# Patient Record
Sex: MALE | Race: BLACK OR AFRICAN AMERICAN | NOT HISPANIC OR LATINO | URBAN - METROPOLITAN AREA
[De-identification: names, ages, dates, MRNs, and addresses within clinical notes are randomized per-mention and may not be internally consistent; named-entity substitution may affect disease eponyms.]

---

## 2021-07-11 ENCOUNTER — EMERGENCY (EMERGENCY)
Facility: HOSPITAL | Age: 31
LOS: 1 days | Discharge: HOME | End: 2021-07-11
Attending: EMERGENCY MEDICINE
Payer: COMMERCIAL

## 2021-07-11 VITALS
SYSTOLIC BLOOD PRESSURE: 133 MMHG | TEMPERATURE: 99 F | HEART RATE: 68 BPM | RESPIRATION RATE: 16 BRPM | DIASTOLIC BLOOD PRESSURE: 85 MMHG | HEIGHT: 75 IN | WEIGHT: 289.91 LBS | OXYGEN SATURATION: 97 %

## 2021-07-11 VITALS
SYSTOLIC BLOOD PRESSURE: 132 MMHG | TEMPERATURE: 99 F | DIASTOLIC BLOOD PRESSURE: 88 MMHG | HEART RATE: 70 BPM | RESPIRATION RATE: 18 BRPM | OXYGEN SATURATION: 98 %

## 2021-07-11 PROCEDURE — 96372 THER/PROPH/DIAG INJ SC/IM: CPT

## 2021-07-11 PROCEDURE — 99284 EMERGENCY DEPT VISIT MOD MDM: CPT

## 2021-07-11 PROCEDURE — 99283 EMERGENCY DEPT VISIT LOW MDM: CPT | Mod: 25

## 2021-07-11 RX ORDER — KETOROLAC TROMETHAMINE 30 MG/ML
30 SYRINGE (ML) INJECTION ONCE
Refills: 0 | Status: DISCONTINUED | OUTPATIENT
Start: 2021-07-11 | End: 2021-07-11

## 2021-07-11 RX ORDER — ACETAMINOPHEN 500 MG
975 TABLET ORAL ONCE
Refills: 0 | Status: COMPLETED | OUTPATIENT
Start: 2021-07-11 | End: 2021-07-11

## 2021-07-11 RX ORDER — DEXAMETHASONE 0.5 MG/5ML
10 ELIXIR ORAL ONCE
Refills: 0 | Status: COMPLETED | OUTPATIENT
Start: 2021-07-11 | End: 2021-07-11

## 2021-07-11 RX ORDER — IBUPROFEN 200 MG
600 TABLET ORAL ONCE
Refills: 0 | Status: COMPLETED | OUTPATIENT
Start: 2021-07-11 | End: 2021-07-11

## 2021-07-11 RX ADMIN — Medication 30 MILLIGRAM(S): at 12:36

## 2021-07-11 RX ADMIN — Medication 30 MILLIGRAM(S): at 14:21

## 2021-07-11 RX ADMIN — Medication 10 MILLIGRAM(S): at 12:37

## 2021-07-11 NOTE — ED PROVIDER NOTE - CLINICAL SUMMARY MEDICAL DECISION MAKING FREE TEXT BOX
Lucero Benjamin MD - Attending Physician: Pt here with sore throat, pain with swallowing. No drooling, FROM neck, no trismus, no muffled voice. Uvula midline, no signs of PTA. No red flags. Symptomatic treatment, PO chall

## 2021-07-11 NOTE — ED PROVIDER NOTE - THROAT FINDINGS
no lymphadenopathy/no exudate/THROAT RED/uvula midline/no vesicles/NO VESICLES/ULCERS/NO DROOLING/NO TONGUE ELEVATION/NO STRIDOR no lymphadenopathy/no exudate/THROAT RED/uvula midline/no vesicles/NO VESICLES/ULCERS/TONSILLAR SWELLING/NO DROOLING/NO TONGUE ELEVATION/NO STRIDOR

## 2021-07-11 NOTE — ED ADULT NURSE NOTE - OBJECTIVE STATEMENT
pt here for a sore throat for 5 days   he went to [facility] and was told he had a red throat and was tested negative for covid.  pt cannot swallow own saliva and has had no po fluid today  he has voided "normally"  no fevers noted

## 2021-07-11 NOTE — ED PROVIDER NOTE - OBJECTIVE STATEMENT
29yo male pt, 29yo male pt, no PMHx, current smoker presents to ED 31yo male pt, no PMHx, current smoker presents to ED c/o left sore throat and painful swallowing for 3days. Pt's also had watery diarrhea since yesterday (5time a day). Pt's seen by UC yesterday and checked both strep and COVID negative. He's not taken any pain medications due to painful swallowing. Denies drooling. Denies fever, chills, or congestion. Denies CP/SOB/ABD pain or N/V. Denies urinary problems. Denies sensory changes or weakness to extremities.

## 2021-07-11 NOTE — ED PROVIDER NOTE - PATIENT PORTAL LINK FT
You can access the FollowMyHealth Patient Portal offered by [facility] by registering at the following website: [email or web address]. By joining [employer]’s FollowMyHealth portal, you will also be able to view your health information using other applications (apps) compatible with our system.

## 2021-07-12 ENCOUNTER — EMERGENCY (EMERGENCY)
Facility: HOSPITAL | Age: 31
LOS: 1 days | Discharge: HOME | End: 2021-07-12
Attending: EMERGENCY MEDICINE
Payer: COMMERCIAL

## 2021-07-12 VITALS
SYSTOLIC BLOOD PRESSURE: 112 MMHG | HEIGHT: 75 IN | WEIGHT: 289.91 LBS | DIASTOLIC BLOOD PRESSURE: 69 MMHG | TEMPERATURE: 99 F | HEART RATE: 68 BPM | RESPIRATION RATE: 18 BRPM | OXYGEN SATURATION: 98 %

## 2021-07-12 DIAGNOSIS — J36 PERITONSILLAR ABSCESS: ICD-10-CM

## 2021-07-12 LAB
ALBUMIN SERPL ELPH-MCNC: 4.4 G/DL — SIGNIFICANT CHANGE UP (ref 3.3–5)
ALP SERPL-CCNC: 86 U/L — SIGNIFICANT CHANGE UP (ref 40–120)
ALT FLD-CCNC: 20 U/L — SIGNIFICANT CHANGE UP (ref 10–45)
ANION GAP SERPL CALC-SCNC: 14 MMOL/L — SIGNIFICANT CHANGE UP (ref 5–17)
AST SERPL-CCNC: 16 U/L — SIGNIFICANT CHANGE UP (ref 10–40)
BASE EXCESS BLDV CALC-SCNC: 4.2 MMOL/L — HIGH (ref -2–2)
BASOPHILS # BLD AUTO: 0 K/UL — SIGNIFICANT CHANGE UP (ref 0–0.2)
BASOPHILS NFR BLD AUTO: 0 % — SIGNIFICANT CHANGE UP (ref 0–2)
BILIRUB SERPL-MCNC: 0.9 MG/DL — SIGNIFICANT CHANGE UP (ref 0.2–1.2)
BUN SERPL-MCNC: 18 MG/DL — SIGNIFICANT CHANGE UP (ref 7–23)
CA-I SERPL-SCNC: 1.16 MMOL/L — SIGNIFICANT CHANGE UP (ref 1.12–1.3)
CALCIUM SERPL-MCNC: 10.1 MG/DL — SIGNIFICANT CHANGE UP (ref 8.4–10.5)
CHLORIDE BLDV-SCNC: 107 MMOL/L — SIGNIFICANT CHANGE UP (ref 96–108)
CHLORIDE SERPL-SCNC: 102 MMOL/L — SIGNIFICANT CHANGE UP (ref 96–108)
CO2 BLDV-SCNC: 29 MMOL/L — SIGNIFICANT CHANGE UP (ref 22–30)
CO2 SERPL-SCNC: 22 MMOL/L — SIGNIFICANT CHANGE UP (ref 22–31)
CREAT SERPL-MCNC: 1.4 MG/DL — HIGH (ref 0.5–1.3)
EOSINOPHIL # BLD AUTO: 0 K/UL — SIGNIFICANT CHANGE UP (ref 0–0.5)
EOSINOPHIL NFR BLD AUTO: 0 % — SIGNIFICANT CHANGE UP (ref 0–6)
GAS PNL BLDV: 138 MMOL/L — SIGNIFICANT CHANGE UP (ref 135–145)
GAS PNL BLDV: SIGNIFICANT CHANGE UP
GIANT PLATELETS BLD QL SMEAR: PRESENT — SIGNIFICANT CHANGE UP
GLUCOSE BLDV-MCNC: 118 MG/DL — HIGH (ref 70–99)
GLUCOSE SERPL-MCNC: 116 MG/DL — HIGH (ref 70–99)
HCO3 BLDV-SCNC: 28 MMOL/L — SIGNIFICANT CHANGE UP (ref 21–29)
HCT VFR BLD CALC: 49.2 % — SIGNIFICANT CHANGE UP (ref 39–50)
HCT VFR BLDA CALC: 56 % — HIGH (ref 39–50)
HGB BLD CALC-MCNC: 18.5 G/DL — HIGH (ref 13–17)
HGB BLD-MCNC: 17.4 G/DL — HIGH (ref 13–17)
LACTATE BLDV-MCNC: 1.6 MMOL/L — SIGNIFICANT CHANGE UP (ref 0.7–2)
LYMPHOCYTES # BLD AUTO: 12.2 % — LOW (ref 13–44)
LYMPHOCYTES # BLD AUTO: 2.13 K/UL — SIGNIFICANT CHANGE UP (ref 1–3.3)
MANUAL SMEAR VERIFICATION: SIGNIFICANT CHANGE UP
MCHC RBC-ENTMCNC: 30.3 PG — SIGNIFICANT CHANGE UP (ref 27–34)
MCHC RBC-ENTMCNC: 35.4 GM/DL — SIGNIFICANT CHANGE UP (ref 32–36)
MCV RBC AUTO: 85.6 FL — SIGNIFICANT CHANGE UP (ref 80–100)
MONOCYTES # BLD AUTO: 1.68 K/UL — HIGH (ref 0–0.9)
MONOCYTES NFR BLD AUTO: 9.6 % — SIGNIFICANT CHANGE UP (ref 2–14)
NEUTROPHILS # BLD AUTO: 13.67 K/UL — HIGH (ref 1.8–7.4)
NEUTROPHILS NFR BLD AUTO: 78.2 % — HIGH (ref 43–77)
OTHER CELLS CSF MANUAL: 24 ML/DL — HIGH (ref 18–22)
PCO2 BLDV: 41 MMHG — SIGNIFICANT CHANGE UP (ref 35–50)
PH BLDV: 7.45 — SIGNIFICANT CHANGE UP (ref 7.35–7.45)
PLAT MORPH BLD: NORMAL — SIGNIFICANT CHANGE UP
PLATELET # BLD AUTO: 227 K/UL — SIGNIFICANT CHANGE UP (ref 150–400)
PO2 BLDV: 65 MMHG — HIGH (ref 25–45)
POTASSIUM BLDV-SCNC: 5.2 MMOL/L — SIGNIFICANT CHANGE UP (ref 3.5–5.3)
POTASSIUM SERPL-MCNC: 4.3 MMOL/L — SIGNIFICANT CHANGE UP (ref 3.5–5.3)
POTASSIUM SERPL-SCNC: 4.3 MMOL/L — SIGNIFICANT CHANGE UP (ref 3.5–5.3)
PROT SERPL-MCNC: 8.9 G/DL — HIGH (ref 6–8.3)
RBC # BLD: 5.75 M/UL — SIGNIFICANT CHANGE UP (ref 4.2–5.8)
RBC # FLD: 13.5 % — SIGNIFICANT CHANGE UP (ref 10.3–14.5)
RBC BLD AUTO: NORMAL — SIGNIFICANT CHANGE UP
SAO2 % BLDV: 92 % — HIGH (ref 67–88)
SARS-COV-2 RNA SPEC QL NAA+PROBE: SIGNIFICANT CHANGE UP
SODIUM SERPL-SCNC: 138 MMOL/L — SIGNIFICANT CHANGE UP (ref 135–145)
WBC # BLD: 17.48 K/UL — HIGH (ref 3.8–10.5)
WBC # FLD AUTO: 17.48 K/UL — HIGH (ref 3.8–10.5)

## 2021-07-12 PROCEDURE — 99284 EMERGENCY DEPT VISIT MOD MDM: CPT | Mod: 25

## 2021-07-12 PROCEDURE — ZZZZZ: CPT

## 2021-07-12 PROCEDURE — 70491 CT SOFT TISSUE NECK W/DYE: CPT | Mod: 26,MG

## 2021-07-12 PROCEDURE — 42700 I&D ABSCESS PERITONSILLAR: CPT

## 2021-07-12 PROCEDURE — G1004: CPT

## 2021-07-12 PROCEDURE — 99220: CPT

## 2021-07-12 RX ORDER — AMPICILLIN SODIUM AND SULBACTAM SODIUM 250; 125 MG/ML; MG/ML
3 INJECTION, POWDER, FOR SUSPENSION INTRAMUSCULAR; INTRAVENOUS EVERY 6 HOURS
Refills: 0 | Status: DISCONTINUED | OUTPATIENT
Start: 2021-07-12 | End: 2021-07-16

## 2021-07-12 RX ORDER — DEXAMETHASONE 0.5 MG/5ML
4 ELIXIR ORAL ONCE
Refills: 0 | Status: COMPLETED | OUTPATIENT
Start: 2021-07-12 | End: 2021-07-12

## 2021-07-12 RX ORDER — SODIUM CHLORIDE 9 MG/ML
1000 INJECTION INTRAMUSCULAR; INTRAVENOUS; SUBCUTANEOUS ONCE
Refills: 0 | Status: COMPLETED | OUTPATIENT
Start: 2021-07-12 | End: 2021-07-12

## 2021-07-12 RX ORDER — DEXAMETHASONE 0.5 MG/5ML
10 ELIXIR ORAL EVERY 8 HOURS
Refills: 0 | Status: COMPLETED | OUTPATIENT
Start: 2021-07-12 | End: 2021-07-13

## 2021-07-12 RX ORDER — MORPHINE SULFATE 50 MG/1
4 CAPSULE, EXTENDED RELEASE ORAL ONCE
Refills: 0 | Status: DISCONTINUED | OUTPATIENT
Start: 2021-07-12 | End: 2021-07-12

## 2021-07-12 RX ORDER — KETOROLAC TROMETHAMINE 30 MG/ML
15 SYRINGE (ML) INJECTION ONCE
Refills: 0 | Status: DISCONTINUED | OUTPATIENT
Start: 2021-07-12 | End: 2021-07-12

## 2021-07-12 RX ORDER — SODIUM CHLORIDE 9 MG/ML
1000 INJECTION INTRAMUSCULAR; INTRAVENOUS; SUBCUTANEOUS
Refills: 0 | Status: DISCONTINUED | OUTPATIENT
Start: 2021-07-12 | End: 2021-07-16

## 2021-07-12 RX ORDER — ACETAMINOPHEN 500 MG
975 TABLET ORAL EVERY 6 HOURS
Refills: 0 | Status: DISCONTINUED | OUTPATIENT
Start: 2021-07-12 | End: 2021-07-16

## 2021-07-12 RX ORDER — DEXAMETHASONE 0.5 MG/5ML
6 ELIXIR ORAL ONCE
Refills: 0 | Status: COMPLETED | OUTPATIENT
Start: 2021-07-12 | End: 2021-07-12

## 2021-07-12 RX ORDER — AMPICILLIN SODIUM AND SULBACTAM SODIUM 250; 125 MG/ML; MG/ML
3 INJECTION, POWDER, FOR SUSPENSION INTRAMUSCULAR; INTRAVENOUS ONCE
Refills: 0 | Status: COMPLETED | OUTPATIENT
Start: 2021-07-12 | End: 2021-07-12

## 2021-07-12 RX ORDER — CHLORHEXIDINE GLUCONATE 213 G/1000ML
15 SOLUTION TOPICAL
Refills: 0 | Status: DISCONTINUED | OUTPATIENT
Start: 2021-07-12 | End: 2021-07-16

## 2021-07-12 RX ADMIN — Medication 15 MILLIGRAM(S): at 23:22

## 2021-07-12 RX ADMIN — Medication 15 MILLIGRAM(S): at 22:55

## 2021-07-12 RX ADMIN — SODIUM CHLORIDE 150 MILLILITER(S): 9 INJECTION INTRAMUSCULAR; INTRAVENOUS; SUBCUTANEOUS at 19:33

## 2021-07-12 RX ADMIN — SODIUM CHLORIDE 1000 MILLILITER(S): 9 INJECTION INTRAMUSCULAR; INTRAVENOUS; SUBCUTANEOUS at 14:24

## 2021-07-12 RX ADMIN — AMPICILLIN SODIUM AND SULBACTAM SODIUM 200 GRAM(S): 250; 125 INJECTION, POWDER, FOR SUSPENSION INTRAMUSCULAR; INTRAVENOUS at 21:42

## 2021-07-12 RX ADMIN — Medication 6 MILLIGRAM(S): at 14:24

## 2021-07-12 RX ADMIN — Medication 102 MILLIGRAM(S): at 22:55

## 2021-07-12 RX ADMIN — Medication 15 MILLIGRAM(S): at 14:24

## 2021-07-12 RX ADMIN — MORPHINE SULFATE 4 MILLIGRAM(S): 50 CAPSULE, EXTENDED RELEASE ORAL at 14:52

## 2021-07-12 RX ADMIN — MORPHINE SULFATE 4 MILLIGRAM(S): 50 CAPSULE, EXTENDED RELEASE ORAL at 18:07

## 2021-07-12 RX ADMIN — AMPICILLIN SODIUM AND SULBACTAM SODIUM 200 GRAM(S): 250; 125 INJECTION, POWDER, FOR SUSPENSION INTRAMUSCULAR; INTRAVENOUS at 14:52

## 2021-07-12 RX ADMIN — CHLORHEXIDINE GLUCONATE 15 MILLILITER(S): 213 SOLUTION TOPICAL at 19:35

## 2021-07-12 RX ADMIN — Medication 4 MILLIGRAM(S): at 14:52

## 2021-07-12 NOTE — ED ADULT NURSE NOTE - OBJECTIVE STATEMENT
29 yo male marijuana smoker presents to ED c/o of difficulty swallowing since Thursday. Pt seen and dc here yesterday but states swelling and pain is worsening prompting ED visit. +muffled voice, +redness/swelling to posterior pharynx L > R. Pt not tolerating secretions and spitting it into emesis bag. Lungs CTAB in NAD, able to speak few word sentences due to pain when talking. Denies CP, SOB, n/v/d, fevers, abdominal pain, urinary symptoms, weakness, fatigue, numbness, tingling in upper and lower extremities, HA, blurry vision. VSS updated on plan of care.

## 2021-07-12 NOTE — ED CDU PROVIDER INITIAL DAY NOTE - PROGRESS NOTE DETAILS
CDU PROGRESS NOTE TRACY PEREZ: Received pt at 1900 sign-out. Case/plan reviewed. VSS. ENT: Head NCAT. MMM, posterior pharynx erythematous, b/l tonsillar enlargement L>>R. R sided tonsillar deviation. +mild muffled voice, mild trismus, No exudates. NO excessive salivation, NO evidence  of airway obstruction, No stridor, trachea midline. Pt reports having 5/10 pain, will give Toradol 15mg IVP and closely monitor.

## 2021-07-12 NOTE — ED CDU PROVIDER DISPOSITION NOTE - CLINICAL COURSE
31 yo male, current everyday smoker presents to the ED c/o left side throat pain, difficulty swallowing  4 days. Pt went to urgent care 2 days ago and had negative covid/strep test done. Came to ED yesterday for sxs and was given toradol, and decadron w/ relief so discharged home. Last night pain worsened again and has been progressively worsening w/ severe pain w/ swallowing and difficulty tolerating secretions. Has not been able to tolerate any PO meds today. +Hoarse voice. No fever/chills, weakness, recent travel, cp, sob, neck swelling, new medication usage, perioral/tongue numbness. Vaccinations UTD.  ED Course: Pt with L PTA on exam, drained with small volume return by ENT. Labs significant for WBC 17, Cr 1.4, lactate wnl. CT neck showed "Fairly large bilobed left-sided peritonsillar/hypopharyngeal/supraglottic phlegmon/early abscess. Mild associated retropharyngeal edema." Case d/w ENT who recommended unasyn, decadron, and peridex gargles. Pt sent to CDU for IV abx, pain control, advance diet, IV fluids, frequent eval.  CDU Course: _____ 29 yo male, current everyday smoker presents to the ED c/o left side throat pain, difficulty swallowing  4 days. Pt went to urgent care 2 days ago and had negative covid/strep test done. Came to ED yesterday for sxs and was given toradol, and decadron w/ relief so discharged home. Last night pain worsened again and has been progressively worsening w/ severe pain w/ swallowing and difficulty tolerating secretions. Has not been able to tolerate any PO meds today. +Hoarse voice. No fever/chills, weakness, recent travel, cp, sob, neck swelling, new medication usage, perioral/tongue numbness. Vaccinations UTD.  ED Course: Pt with L PTA on exam, drained with small volume return by ENT. Labs significant for WBC 17, Cr 1.4, lactate wnl. CT neck showed "Fairly large bilobed left-sided peritonsillar/hypopharyngeal/supraglottic phlegmon/early abscess. Mild associated retropharyngeal edema." Case d/w ENT who recommended unasyn, decadron, and peridex gargles. Pt sent to CDU for IV abx, pain control, advance diet, IV fluids, frequent eval.  Patient's symptoms improved in CDU overnight.  I&D performed by ENT at bedside.  Patient tolerating PO.  Afebrile.  Leukocytosis trending down.  WIll dc home on augmentin and medrol dose veronica.

## 2021-07-12 NOTE — ED PROVIDER NOTE - ATTENDING CONTRIBUTION TO CARE
CC Sore throat  30y male pmh tobacco. Seen yesterday w now 4h hx of sore throat. DC home now pw worsening symptoms diff swallowing LEFT>RT, no fever chills,cp,abd pain.Nv. PE Adult male looking acutely ill. Spitting into emesis bag. Heent normocephalic atraumatic neck supple throat left post pharynx swelling c/w pta. chest clear anterior & posterior cv no rubs, gallops or murmurs abd soft   bs no mass guarding neuro no focal defects  Kevyn Ng MD, Facep

## 2021-07-12 NOTE — ED CDU PROVIDER DISPOSITION NOTE - CARE PROVIDER_API CALL
Hector Cross (MD)  Otolaryngology  [address] [place] [address], [facility] [address]  [place] [address]  Phone: [phone]  Fax: [phone]  Follow Up Time:

## 2021-07-12 NOTE — CONSULT NOTE ADULT - ASSESSMENT
31yo male with about aweek of throat pain, odynophagia to both liquids and solids. Exam revealed a possible left PTA S/P I&D at bedside, no pus was expressed. Neck CT with con pending

## 2021-07-12 NOTE — ED PROVIDER NOTE - PROGRESS NOTE DETAILS
ENT at beside, requesting another 4 mg decadron for total of 10 mg as well as Unasyn. - EVIE DaveC ENT at beside, requesting another 4 mg decadron for total of 10 mg as well as Unasyn. Additionally requesting cancelling CT scan as they are going to attempt bedside drainage. - Romario Wells PA-C ENT was only able to drain a small amount, now requesting CT of neck to r/o deeper space infection. CT re-ordered. Pt aware. - Romario Wells PA-C

## 2021-07-12 NOTE — ED CDU PROVIDER INITIAL DAY NOTE - PHYSICAL EXAMINATION
GEN: Pt in NAD, A&O x3.  PSYCH: Affect appropriate.  EYES: Sclera white w/o injection.   ENT: Head NCAT. MMM, posterior pharynx erythematous, b/l tonsillar enlargement L>>R. R sided tonsillar deviation. +mild muffled voice, mild trismus, +exudates on L tonsil. NO excessive salivation, NO evidence  of airway obstruction, NO pus on the floor of the mouth, NO protrusion of the submental area, NO vesicles, NO obvious dental damage. Neck supple FROM. No stridor, trachea midline. +b/l anteiror cervical LAD, +L tonsillar LAD, mildly tender, mobile.  RESP: Speaking in full sentences no evidence of respiratory distress. CTA b/l, no wheezes, rales, or rhonchi.   CARDIAC: RRR, clear distinct S1, S2, no appreciable murmurs.  ABD: Abdomen soft, non-tender, no HSM. No CVAT b/l.  VASC: 2+ radial and dorsalis pedis pulses b/l. No edema or calf tenderness.  SKIN: No rashes on the trunk.

## 2021-07-12 NOTE — ED CDU PROVIDER INITIAL DAY NOTE - OBJECTIVE STATEMENT
29 yo male, current everyday smoker presents to the ED c/o left side throat pain, difficulty swallowing  4 days. Pt went to urgent care 2 days ago and had negative covid/strep test done. Came to ED yesterday for sxs and was given toradol, and decadron w/ relief so discharged home. Last night pain worsened again and has been progressively worsening w/ severe pain w/ swallowing and difficulty tolerating secretions. Has not been able to tolerate any PO meds today. +Hoarse voice. No fever/chills, weakness, recent travel, cp, sob, neck swelling, new medication usage, perioral/tongue numbness. Vaccinations UTD.  ED Course: Pt with L PTA on exam, drained with small volume return by ENT. Labs significant for WBC 17, Cr 1.4, lactate wnl. CT neck showed "Fairly large bilobed left-sided peritonsillar/hypopharyngeal/supraglottic phlegmon/early abscess. Mild associated retropharyngeal edema." Case d/w ENT who recommended unasyn, decadron, and peridex gargles. Pt sent to CDU for IV abx, pain control, advance diet, IV fluids, frequent eval.

## 2021-07-12 NOTE — ED PROVIDER NOTE - OBJECTIVE STATEMENT
29 yo male, current everyday smoker presents to the ED c/o left side throat pain, difficulty swallowing  4 days. Pt went to urgent care 2 days ago and had negative covid/strep test done. Came to ED yesterday for sxs and was given toradol, and decadron w/ relief so discharged home. Last night pain worsened again and has been progressively worsening w/ severe pain w/ swallowing and difficulty tolerating secretions. Has not been able to tolerate any PO meds today. +Hoarse voice. No fever/chills, weakness, recent travel, cp, sob, neck swelling, new medication usage, perioral/tongue numbness. Vaccinations UTD.

## 2021-07-12 NOTE — ED CDU PROVIDER DISPOSITION NOTE - PATIENT PORTAL LINK FT
You can access the FollowMyHealth Patient Portal offered by [facility] by registering at the following website: [email or web address]. By joining [employer]’s FollowMyHealth portal, you will also be able to view your health information using other applications (apps) compatible with our system.

## 2021-07-12 NOTE — ED CDU PROVIDER INITIAL DAY NOTE - ATTENDING CONTRIBUTION TO CARE
I have personally performed a face to face diagnostic evaluation on this patient.  I have reviewed the ACP note and agree with the history, exam, and plan of care, except as noted.  History and Exam by me shows  See Ed provider note  Kevyn Ng MD, Facep

## 2021-07-12 NOTE — CONSULT NOTE ADULT - PROBLEM SELECTOR RECOMMENDATION 9
Decadron 10mg IV Q8hrs  Peridex gargles TID  Neck CT with contrast  Pain control  IV unasyn  Encourage PO intake  F/U labs

## 2021-07-12 NOTE — ED PROVIDER NOTE - CLINICAL SUMMARY MEDICAL DECISION MAKING FREE TEXT BOX
Adult male with c/o sore throat x 4d worsening now we diff swallowing. Exam cw pta, Cs ent. Ct neck soft tissue. iv steroids,   Kevyn Ng MD, Facep

## 2021-07-12 NOTE — ED ADULT TRIAGE NOTE - CHIEF COMPLAINT QUOTE
difficulty swallowing x 6 days with left ear pain, states he was seen last night in ER still no relief.  Pt states cyst is not big enough to drain

## 2021-07-12 NOTE — ED CDU PROVIDER INITIAL DAY NOTE - MEDICAL DECISION MAKING DETAILS
PTA with Swelling tenderness pain, seen by ent slight drainage for CT neck, abx steroids and final ent rec  Kevyn Ng MD, Facep

## 2021-07-12 NOTE — CONSULT NOTE ADULT - ATTENDING COMMENTS
no significant purulence after drainage and no trismus, suspect phlegmon.   Poor tolerance of bedside drainage due to gag, will obtain CT to r/o low posterior PTA.  steroids for uvular edema, unasyn, CDU for obs, chlorhexidine rinses.  f/up cx

## 2021-07-12 NOTE — ED CDU PROVIDER DISPOSITION NOTE - NSFOLLOWUPINSTRUCTIONS_ED_ALL_ED_FT
Follow-up with your primary care provider within 2-3 days.     Take Augmentin as prescribed.    Pain can be managed with Acetaminophen over the counter as directed. ??And ibuprofen?    Continue to take all other medications as directed.    Return to the emergency room immediately if your symptoms worsen or persist, or if you have fever despite use of tylenol, headache, severe vomiting, loss of consciousness (passing out), difficulty breathing, drooling, pus in the mouth, difficulty swallowing, change in voice, or if any other concerning or questionable symptoms. 1.  Stay well hydrated  2.  Eat a soft diet  3.  Take Augmentin 875mgs every 12 hrs x 7 days  4.  Take Medrol DOsepak as prescribed  5.  Hydrogen peroxide 1.5% swish and spit every 8 hrs x 7 days  6.  FOllow up with ENT, Dr. Cross in one week       Follow up with Cardiology for low heart rate  7. Return to the emergency room immediately if your symptoms worsen or persist, or if you have fever despite use of tylenol, headache, severe vomiting, loss of consciousness (passing out), difficulty breathing, drooling, pus in the mouth, difficulty swallowing, change in voice, or if any other concerning or questionable symptoms.    Hector Cross)  Otolaryngology  [address] [place] [address], [address]  [place] [address]  Phone: [phone]  Fax: [phone]      [facility]  Cardiology  [address] [place] [id number]  Phone: [phone]

## 2021-07-12 NOTE — ED PROVIDER NOTE - THROAT FINDINGS
+spitting into emesis bag at bedside. No tongue elevation. Soft submental/submandibular space. +diffuse erythema to posterior pharynx with swelling to L aspect of throat./THROAT RED/HOARSE/MUFFLED VOICE/NO STRIDOR

## 2021-07-12 NOTE — ED CDU PROVIDER DISPOSITION NOTE - NSFOLLOWUPCLINICS_GEN_ALL_ED_FT
[facility]  Cardiology  [address] [place] [id number]  Phone: [phone]  Fax:

## 2021-07-12 NOTE — CONSULT NOTE ADULT - SUBJECTIVE AND OBJECTIVE BOX
CC: Throat pain, R/O PTA    HPI: 29 yo male, current everyday smoker presents to the ED c/o left side throat pain, difficulty swallowing  4 days. Pt went to urgent care 2 days ago and had negative covid/strep test done. Came to ED yesterday for sxs and was given toradol, and decadron w/ relief so discharged home. Last night pain worsened again and has been progressively worsening w/ severe pain w/ swallowing and difficulty tolerating secretions. Has not been able to tolerate any PO meds today. +Hoarse voice. No fever/chills, weakness, recent travel, cp, sob, neck swelling, new medication usage, perioral/tongue numbness. Vaccinations UTD        PAST MEDICAL & SURGICAL HISTORY:    Allergies    No Known Allergies    Intolerances      MEDICATIONS  (STANDING):    MEDICATIONS  (PRN):      Social History: current smoker    Family history: no pertinent FHx    ROS:   ENT: all negative except as noted in HPI   CV: denies palpitations  Pulm: denies SOB, cough, hemoptysis  GI: denies change in apetite, indigestion, n/v  : denies pertinent urinary symptoms, urgency  Neuro: denies numbness/tingling, loss of sensation  Psych: denies anxiety  MS: denies muscle weakness, instability  Heme: denies easy bruising or bleeding  Endo: denies heat/cold intolerance, excessive sweating  Vascular: denies LE edema    Vital Signs Last 24 Hrs  T(C): 37.3 (12 Jul 2021 14:20), Max: 37.4 (12 Jul 2021 13:30)  T(F): 99.2 (12 Jul 2021 14:20), Max: 99.3 (12 Jul 2021 13:30)  HR: 58 (12 Jul 2021 14:20) (58 - 68)  BP: 143/89 (12 Jul 2021 14:20) (112/69 - 143/89)  BP(mean): --  RR: 18 (12 Jul 2021 14:20) (18 - 18)  SpO2: 95% (12 Jul 2021 14:20) (95% - 98%)                          17.4   17.48 )-----------( 227      ( 12 Jul 2021 14:15 )             49.2    07-12    138  |  102  |  18  ----------------------------<  116<H>  4.3   |  22  |  1.40<H>    Ca    10.1      12 Jul 2021 14:15    TPro  8.9<H>  /  Alb  4.4  /  TBili  0.9  /  DBili  x   /  AST  16  /  ALT  20  /  AlkPhos  86  07-12       PHYSICAL EXAM:  Gen: NAD  Skin: No rashes, bruises, or lesions  Head: Normocephalic, Atraumatic  Face: no edema, erythema, or fluctuance. Parotid glands soft without mass  Eyes: no scleral injection  Ears: Right - ear canal clear, TM intact without effusion or erythema. No evidence of any fluid drainage. No mastoid tenderness, erythema, or ear bulging            Left - ear canal clear, TM intact without effusion or erythema. No evidence of any fluid drainage. No mastoid tenderness, erythema, or ear bulging  Nose: Nares bilaterally patent, no discharge  Mouth: B/L tonsillar swelling, L>R, uvular edema and deviation, drooling, Trismus.  No stridor, Mucosa moist, tongue midline  Neck: Flat, supple, no lymphadenopathy, trachea midline, no masses  Lymphatic: No lymphadenopathy  Resp: breathing easily, no stridor  CV: no peripheral edema/cyanosis  GI: nondistended   Peripheral vascular: no JVD or edema  Neuro: facial nerve intact, no facial droop        Diagnostic Nasal Endoscopy: (Scope #2 used)    Fiberoptic Indirect laryngoscopy:  (Scope #2 used)        IMAGING/ADDITIONAL STUDIES:  CC: Throat pain, R/O PTA    HPI: 29 yo male, current everyday smoker presents to the ED c/o left side throat pain, difficulty swallowing  4 days. Pt went to urgent care 2 days ago and had negative covid/strep test done. Came to ED yesterday for sxs and was given toradol, and decadron w/ relief so discharged home. Last night pain worsened again and has been progressively worsening w/ severe pain w/ swallowing and difficulty tolerating secretions. Has not been able to tolerate any PO meds today. +Hoarse voice. No fever/chills, weakness, recent travel, cp, sob, neck swelling, new medication usage, perioral/tongue numbness. Vaccinations UTD        PAST MEDICAL & SURGICAL HISTORY:    Allergies    No Known Allergies    Intolerances      MEDICATIONS  (STANDING):    MEDICATIONS  (PRN):      Social History: current smoker    Family history: no pertinent FHx    ROS:   ENT: all negative except as noted in HPI   CV: denies palpitations  Pulm: denies SOB, cough, hemoptysis  GI: denies change in apetite, indigestion, n/v  : denies pertinent urinary symptoms, urgency  Neuro: denies numbness/tingling, loss of sensation  Psych: denies anxiety  MS: denies muscle weakness, instability  Heme: denies easy bruising or bleeding  Endo: denies heat/cold intolerance, excessive sweating  Vascular: denies LE edema    Vital Signs Last 24 Hrs  T(C): 37.3 (12 Jul 2021 14:20), Max: 37.4 (12 Jul 2021 13:30)  T(F): 99.2 (12 Jul 2021 14:20), Max: 99.3 (12 Jul 2021 13:30)  HR: 58 (12 Jul 2021 14:20) (58 - 68)  BP: 143/89 (12 Jul 2021 14:20) (112/69 - 143/89)  BP(mean): --  RR: 18 (12 Jul 2021 14:20) (18 - 18)  SpO2: 95% (12 Jul 2021 14:20) (95% - 98%)                          17.4   17.48 )-----------( 227      ( 12 Jul 2021 14:15 )             49.2    07-12    138  |  102  |  18  ----------------------------<  116<H>  4.3   |  22  |  1.40<H>    Ca    10.1      12 Jul 2021 14:15    TPro  8.9<H>  /  Alb  4.4  /  TBili  0.9  /  DBili  x   /  AST  16  /  ALT  20  /  AlkPhos  86  07-12       PHYSICAL EXAM:  Gen: NAD  Skin: No rashes, bruises, or lesions  Head: Normocephalic, Atraumatic  Face: no edema, erythema, or fluctuance. Parotid glands soft without mass  Eyes: no scleral injection  Ears: Right - ear canal clear, TM intact without effusion or erythema. No evidence of any fluid drainage. No mastoid tenderness, erythema, or ear bulging            Left - ear canal clear, TM intact without effusion or erythema. No evidence of any fluid drainage. No mastoid tenderness, erythema, or ear bulging  Nose: Nares bilaterally patent, no discharge  Mouth: B/L tonsillar swelling, L>R, uvular edema and deviation, drooling, Trismus.  No stridor, Mucosa moist, tongue midline  Neck: supple, Left tender lymphadenopathy, trachea midline, no masses  Lymphatic: No lymphadenopathy  Resp: breathing easily, no stridor  CV: no peripheral edema/cyanosis  GI: nondistended   Peripheral vascular: no JVD or edema  Neuro: facial nerve intact, no facial droop      IMAGING/ADDITIONAL STUDIES: Neck CT with con pending    Procedure: Procedure Note: I&D of PTA    Written Consent obtained from patient.   Cetacaine sprayed onto left peritonsillar area. 3cc of 2% lidocaine with epinephrine injected into anterior tonsilalr pillar with 10cc syringe and 22G needle. Cetacaine also sprayed over the left tonsil for topical anesthesia. 15 blade used to make lacunar incision and hemostat used to explore wound, CXs taken. No vesna pus was expressed. Pt with a very strong gag reflex. No active bleeding.

## 2021-07-12 NOTE — ED ADULT TRIAGE NOTE - RESPIRATORY RATE (BREATHS/MIN)
----- Message from Maco Garcia sent at 12/2/2019  2:47 PM CST -----  Contact: pt   Type:  Patient Returning Call    Who Called: TOPHER RENDON   Who Left Message for Patient: nurse   Does the patient know what this is regarding?:   Would the patient rather a call back or a response via [facility]? Call   Best Call Back Number: [phone] (home)    Additional Information:      18

## 2021-07-13 VITALS
SYSTOLIC BLOOD PRESSURE: 119 MMHG | TEMPERATURE: 98 F | OXYGEN SATURATION: 95 % | HEART RATE: 51 BPM | DIASTOLIC BLOOD PRESSURE: 76 MMHG | RESPIRATION RATE: 18 BRPM

## 2021-07-13 LAB
ALBUMIN SERPL ELPH-MCNC: 4 G/DL — SIGNIFICANT CHANGE UP (ref 3.3–5)
ALP SERPL-CCNC: 70 U/L — SIGNIFICANT CHANGE UP (ref 40–120)
ALT FLD-CCNC: 19 U/L — SIGNIFICANT CHANGE UP (ref 10–45)
ANION GAP SERPL CALC-SCNC: 13 MMOL/L — SIGNIFICANT CHANGE UP (ref 5–17)
AST SERPL-CCNC: 14 U/L — SIGNIFICANT CHANGE UP (ref 10–40)
BASOPHILS # BLD AUTO: 0.01 K/UL — SIGNIFICANT CHANGE UP (ref 0–0.2)
BASOPHILS NFR BLD AUTO: 0.1 % — SIGNIFICANT CHANGE UP (ref 0–2)
BILIRUB SERPL-MCNC: 0.7 MG/DL — SIGNIFICANT CHANGE UP (ref 0.2–1.2)
BUN SERPL-MCNC: 22 MG/DL — SIGNIFICANT CHANGE UP (ref 7–23)
CALCIUM SERPL-MCNC: 10 MG/DL — SIGNIFICANT CHANGE UP (ref 8.4–10.5)
CHLORIDE SERPL-SCNC: 104 MMOL/L — SIGNIFICANT CHANGE UP (ref 96–108)
CO2 SERPL-SCNC: 23 MMOL/L — SIGNIFICANT CHANGE UP (ref 22–31)
CREAT SERPL-MCNC: 1.33 MG/DL — HIGH (ref 0.5–1.3)
EOSINOPHIL # BLD AUTO: 0 K/UL — SIGNIFICANT CHANGE UP (ref 0–0.5)
EOSINOPHIL NFR BLD AUTO: 0 % — SIGNIFICANT CHANGE UP (ref 0–6)
GLUCOSE SERPL-MCNC: 123 MG/DL — HIGH (ref 70–99)
HCT VFR BLD CALC: 44.8 % — SIGNIFICANT CHANGE UP (ref 39–50)
HGB BLD-MCNC: 15.6 G/DL — SIGNIFICANT CHANGE UP (ref 13–17)
IMM GRANULOCYTES NFR BLD AUTO: 0.5 % — SIGNIFICANT CHANGE UP (ref 0–1.5)
LYMPHOCYTES # BLD AUTO: 0.93 K/UL — LOW (ref 1–3.3)
LYMPHOCYTES # BLD AUTO: 6.1 % — LOW (ref 13–44)
MCHC RBC-ENTMCNC: 30.1 PG — SIGNIFICANT CHANGE UP (ref 27–34)
MCHC RBC-ENTMCNC: 34.8 GM/DL — SIGNIFICANT CHANGE UP (ref 32–36)
MCV RBC AUTO: 86.5 FL — SIGNIFICANT CHANGE UP (ref 80–100)
MONOCYTES # BLD AUTO: 0.56 K/UL — SIGNIFICANT CHANGE UP (ref 0–0.9)
MONOCYTES NFR BLD AUTO: 3.7 % — SIGNIFICANT CHANGE UP (ref 2–14)
NEUTROPHILS # BLD AUTO: 13.68 K/UL — HIGH (ref 1.8–7.4)
NEUTROPHILS NFR BLD AUTO: 89.6 % — HIGH (ref 43–77)
NRBC # BLD: 0 /100 WBCS — SIGNIFICANT CHANGE UP (ref 0–0)
PLATELET # BLD AUTO: 220 K/UL — SIGNIFICANT CHANGE UP (ref 150–400)
POTASSIUM SERPL-MCNC: 4.6 MMOL/L — SIGNIFICANT CHANGE UP (ref 3.5–5.3)
POTASSIUM SERPL-SCNC: 4.6 MMOL/L — SIGNIFICANT CHANGE UP (ref 3.5–5.3)
PROT SERPL-MCNC: 7.7 G/DL — SIGNIFICANT CHANGE UP (ref 6–8.3)
RBC # BLD: 5.18 M/UL — SIGNIFICANT CHANGE UP (ref 4.2–5.8)
RBC # FLD: 13.4 % — SIGNIFICANT CHANGE UP (ref 10.3–14.5)
SODIUM SERPL-SCNC: 140 MMOL/L — SIGNIFICANT CHANGE UP (ref 135–145)
WBC # BLD: 15.25 K/UL — HIGH (ref 3.8–10.5)
WBC # FLD AUTO: 15.25 K/UL — HIGH (ref 3.8–10.5)

## 2021-07-13 PROCEDURE — 84295 ASSAY OF SERUM SODIUM: CPT

## 2021-07-13 PROCEDURE — U0005: CPT

## 2021-07-13 PROCEDURE — G0378: CPT

## 2021-07-13 PROCEDURE — 82330 ASSAY OF CALCIUM: CPT

## 2021-07-13 PROCEDURE — 96374 THER/PROPH/DIAG INJ IV PUSH: CPT | Mod: XU

## 2021-07-13 PROCEDURE — 96375 TX/PRO/DX INJ NEW DRUG ADDON: CPT | Mod: XU

## 2021-07-13 PROCEDURE — 99217: CPT

## 2021-07-13 PROCEDURE — 83605 ASSAY OF LACTIC ACID: CPT

## 2021-07-13 PROCEDURE — 80053 COMPREHEN METABOLIC PANEL: CPT

## 2021-07-13 PROCEDURE — 93010 ELECTROCARDIOGRAM REPORT: CPT

## 2021-07-13 PROCEDURE — 85025 COMPLETE CBC W/AUTO DIFF WBC: CPT

## 2021-07-13 PROCEDURE — 82947 ASSAY GLUCOSE BLOOD QUANT: CPT

## 2021-07-13 PROCEDURE — 99284 EMERGENCY DEPT VISIT MOD MDM: CPT | Mod: 25

## 2021-07-13 PROCEDURE — U0003: CPT

## 2021-07-13 PROCEDURE — 93005 ELECTROCARDIOGRAM TRACING: CPT

## 2021-07-13 PROCEDURE — 82435 ASSAY OF BLOOD CHLORIDE: CPT

## 2021-07-13 PROCEDURE — 87070 CULTURE OTHR SPECIMN AEROBIC: CPT

## 2021-07-13 PROCEDURE — 82803 BLOOD GASES ANY COMBINATION: CPT

## 2021-07-13 PROCEDURE — 85014 HEMATOCRIT: CPT

## 2021-07-13 PROCEDURE — 84132 ASSAY OF SERUM POTASSIUM: CPT

## 2021-07-13 PROCEDURE — 70491 CT SOFT TISSUE NECK W/DYE: CPT

## 2021-07-13 PROCEDURE — 87040 BLOOD CULTURE FOR BACTERIA: CPT

## 2021-07-13 PROCEDURE — 85018 HEMOGLOBIN: CPT

## 2021-07-13 PROCEDURE — 96376 TX/PRO/DX INJ SAME DRUG ADON: CPT | Mod: XU

## 2021-07-13 RX ORDER — KETOROLAC TROMETHAMINE 30 MG/ML
30 SYRINGE (ML) INJECTION ONCE
Refills: 0 | Status: DISCONTINUED | OUTPATIENT
Start: 2021-07-13 | End: 2021-07-13

## 2021-07-13 RX ORDER — HYDROGEN PEROXIDE 0.3 KG/100L
10 LIQUID TOPICAL
Qty: 150 | Refills: 0
Start: 2021-07-13 | End: 2021-07-17

## 2021-07-13 RX ORDER — ACETAMINOPHEN 500 MG
1000 TABLET ORAL ONCE
Refills: 0 | Status: COMPLETED | OUTPATIENT
Start: 2021-07-13 | End: 2021-07-13

## 2021-07-13 RX ADMIN — Medication 102 MILLIGRAM(S): at 06:36

## 2021-07-13 RX ADMIN — Medication 1000 MILLIGRAM(S): at 04:22

## 2021-07-13 RX ADMIN — CHLORHEXIDINE GLUCONATE 15 MILLILITER(S): 213 SOLUTION TOPICAL at 08:50

## 2021-07-13 RX ADMIN — Medication 30 MILLIGRAM(S): at 16:54

## 2021-07-13 RX ADMIN — Medication 400 MILLIGRAM(S): at 03:55

## 2021-07-13 RX ADMIN — AMPICILLIN SODIUM AND SULBACTAM SODIUM 200 GRAM(S): 250; 125 INJECTION, POWDER, FOR SUSPENSION INTRAMUSCULAR; INTRAVENOUS at 03:55

## 2021-07-13 RX ADMIN — AMPICILLIN SODIUM AND SULBACTAM SODIUM 200 GRAM(S): 250; 125 INJECTION, POWDER, FOR SUSPENSION INTRAMUSCULAR; INTRAVENOUS at 15:22

## 2021-07-13 RX ADMIN — AMPICILLIN SODIUM AND SULBACTAM SODIUM 200 GRAM(S): 250; 125 INJECTION, POWDER, FOR SUSPENSION INTRAMUSCULAR; INTRAVENOUS at 08:50

## 2021-07-13 RX ADMIN — SODIUM CHLORIDE 150 MILLILITER(S): 9 INJECTION INTRAMUSCULAR; INTRAVENOUS; SUBCUTANEOUS at 04:42

## 2021-07-13 NOTE — PROGRESS NOTE ADULT - SUBJECTIVE AND OBJECTIVE BOX
CC: Throat pain, R/O PTA    HPI: 31 yo male, current everyday smoker presents to the ED c/o left side throat pain, difficulty swallowing  4 days. Pt went to urgent care and had negative covid/strep test done. Came to ED on 7/11 for sxs and was given toradol, and decadron w/ relief so discharged home. On 7/12 pain worsened again and has been progressively worsening w/ severe pain w/ swallowing and difficulty tolerating secretions. Pt is now s/p I&D from 7/12, on unasyn, decadron and chlorhexidine. Reporting much improvement in his symptoms this AM. Minimal pain but denies dysphagia, dysphonia, sob, dyspnea, changes in voice or inability to tolerate secretions.         PAST MEDICAL & SURGICAL HISTORY:  No pertinent past medical history    No significant past surgical history      Allergies    No Known Allergies    Intolerances      MEDICATIONS  (STANDING):  ampicillin/sulbactam  IVPB 3 Gram(s) IV Intermittent every 6 hours  chlorhexidine 0.12% Liquid 15 milliLiter(s) Swish and Spit two times a day  sodium chloride 0.9%. 1000 milliLiter(s) (150 mL/Hr) IV Continuous <Continuous>    MEDICATIONS  (PRN):  acetaminophen   Tablet .. 975 milliGRAM(s) Oral every 6 hours PRN Moderate Pain (4 - 6)      Social History: see consult    Family history: see consult    ROS:   ENT: all negative except as noted in HPI   Pulm: denies SOB, cough, hemoptysis  Neuro: denies numbness/tingling, loss of sensation  Endo: denies heat/cold intolerance, excessive sweating      Vital Signs Last 24 Hrs  T(C): 36.5 (13 Jul 2021 08:04), Max: 37.4 (12 Jul 2021 13:30)  T(F): 97.7 (13 Jul 2021 08:04), Max: 99.3 (12 Jul 2021 13:30)  HR: 47 (13 Jul 2021 08:04) (47 - 68)  BP: 112/57 (13 Jul 2021 08:04) (100/69 - 143/89)  BP(mean): --  RR: 18 (13 Jul 2021 08:04) (18 - 18)  SpO2: 96% (13 Jul 2021 08:04) (95% - 99%)                          15.6   15.25 )-----------( 220      ( 13 Jul 2021 06:21 )             44.8    07-13    140  |  104  |  22  ----------------------------<  123<H>  4.6   |  23  |  1.33<H>    Ca    10.0      13 Jul 2021 06:21    TPro  7.7  /  Alb  4.0  /  TBili  0.7  /  DBili  x   /  AST  14  /  ALT  19  /  AlkPhos  70  07-13       PHYSICAL EXAM:  Gen: NAD  Skin: No rashes, bruises, or lesions  Head: Normocephalic, Atraumatic  Face: no edema, erythema, or fluctuance. Parotid glands soft without mass  Eyes: no scleral injection  Nose: Nares bilaterally patent, no discharge  Mouth: Improving B/L tonsillar swelling, L>R, improving uvular edema, no drooling or trismus. No stridor, Mucosa moist, tongue midline  Neck: supple, Left tender lymphadenopathy, trachea midline, no masses  Lymphatic: No lymphadenopathy  Resp: breathing easily, no stridor  CV: no peripheral edema/cyanosis  GI: nondistended   Peripheral vascular: no JVD or edema  Neuro: facial nerve intact, no facial droop        ADDITIONAL IMAGING: CT NECK  IMPRESSION: Fairly large bilobed left-sided peritonsillar/hypopharyngeal/supraglottic phlegmon/early abscess, as discussed.    Mild associated retropharyngeal edema.    No cervical lymphadenopathy, left side worse than right, which is reactive.    --- End of Report ---       CC: Throat pain, R/O PTA    HPI: 29 yo male, current everyday smoker presents to the ED c/o left side throat pain, difficulty swallowing  4 days. Pt went to urgent care and had negative covid/strep test done. Came to ED on 7/11 for sxs and was given toradol, and decadron w/ relief so discharged home. On 7/12 pain worsened again and has been progressively worsening w/ severe pain w/ swallowing and difficulty tolerating secretions. Pt is now s/p I&D from 7/12, on unasyn, decadron and chlorhexidine. Reporting much improvement in his symptoms this AM. Minimal pain but denies dysphagia, dysphonia, sob, dyspnea, changes in voice or inability to tolerate secretions.         PAST MEDICAL & SURGICAL HISTORY:  No pertinent past medical history    No significant past surgical history      Allergies    No Known Allergies    Intolerances      MEDICATIONS  (STANDING):  ampicillin/sulbactam  IVPB 3 Gram(s) IV Intermittent every 6 hours  chlorhexidine 0.12% Liquid 15 milliLiter(s) Swish and Spit two times a day  sodium chloride 0.9%. 1000 milliLiter(s) (150 mL/Hr) IV Continuous <Continuous>    MEDICATIONS  (PRN):  acetaminophen   Tablet .. 975 milliGRAM(s) Oral every 6 hours PRN Moderate Pain (4 - 6)      Social History: see consult    Family history: see consult    ROS:   ENT: all negative except as noted in HPI   Pulm: denies SOB, cough, hemoptysis  Neuro: denies numbness/tingling, loss of sensation  Endo: denies heat/cold intolerance, excessive sweating      Vital Signs Last 24 Hrs  T(C): 36.5 (13 Jul 2021 08:04), Max: 37.4 (12 Jul 2021 13:30)  T(F): 97.7 (13 Jul 2021 08:04), Max: 99.3 (12 Jul 2021 13:30)  HR: 47 (13 Jul 2021 08:04) (47 - 68)  BP: 112/57 (13 Jul 2021 08:04) (100/69 - 143/89)  BP(mean): --  RR: 18 (13 Jul 2021 08:04) (18 - 18)  SpO2: 96% (13 Jul 2021 08:04) (95% - 99%)                          15.6   15.25 )-----------( 220      ( 13 Jul 2021 06:21 )             44.8    07-13    140  |  104  |  22  ----------------------------<  123<H>  4.6   |  23  |  1.33<H>    Ca    10.0      13 Jul 2021 06:21    TPro  7.7  /  Alb  4.0  /  TBili  0.7  /  DBili  x   /  AST  14  /  ALT  19  /  AlkPhos  70  07-13       PHYSICAL EXAM:  Gen: NAD  Skin: No rashes, bruises, or lesions  Head: Normocephalic, Atraumatic  Face: no edema, erythema, or fluctuance. Parotid glands soft without mass  Eyes: no scleral injection  Nose: Nares bilaterally patent, no discharge  Mouth: Improving B/L tonsillar swelling, L>R, improving uvular edema, no drooling or trismus. No stridor, Mucosa moist, tongue midline  Neck: supple, Left tender lymphadenopathy, trachea midline, no masses  Lymphatic: No lymphadenopathy  Resp: breathing easily, no stridor  CV: no peripheral edema/cyanosis  GI: nondistended   Peripheral vascular: no JVD or edema  Neuro: facial nerve intact, no facial droop        ADDITIONAL IMAGING: CT NECK  IMPRESSION: Fairly large bilobed left-sided peritonsillar/hypopharyngeal/supraglottic phlegmon/early abscess, as discussed.    Mild associated retropharyngeal edema.    No cervical lymphadenopathy, left side worse than right, which is reactive.    --- End of Report ---        Drainage PTA  Dx - PTA   Verbal and written Consent obtained by the patient.   Cetacaine sprayed onto left peritonsillar area. 2cc of 1% lidocaine with epinephrine injected into peritonsillar area. 18G needled used to aspirate, no clear purulence  11blade used to make lacunar incision and hemostat used to explore wound and break up loculations and some debris cleared out, suctioned and irrigated with half strength peroxide, went all the way up and down, making sure everything was open  Patient tolerated procedure well.

## 2021-07-13 NOTE — ED ADULT NURSE REASSESSMENT NOTE - COMFORT CARE
plan of care explained/po fluids offered
meal provided/plan of care explained/po fluids offered/wait time explained

## 2021-07-13 NOTE — PROGRESS NOTE ADULT - ATTENDING COMMENTS
did the I&D  pt tolerated well, opened up any area with any fullness, deep mouth but able to get all open and out, will see how he does

## 2021-07-13 NOTE — PROGRESS NOTE ADULT - PROBLEM SELECTOR PLAN 1
Continue to monitor in CDU  Decadron 10mg IV Q8hrs  Peridex gargles TID  Pain control  IV unasyn  Encourage PO intake  F/U labs. Plan to re-attempt drainage this afternoon   Decadron 10mg IV Q8hrs  Peridex gargles TID  Pain control  IV unasyn  Encourage PO intake  F/U labs. Plan to re-attempt drainage this afternoon   Decadron 10mg IV Q8hrs  Peridex gargles TID  Pain control  IV unasyn  Encourage PO intake  F/U labs.  - can send on augmentin, medrol dose pack and follow up with me in the office in 1 week or before if does not improve or gets worse. discussed concerning s/s

## 2021-07-13 NOTE — PROGRESS NOTE ADULT - ASSESSMENT
31yo male with throat pain x1 wk, odynophagia to both liquids and solids. Exam revealed a possible left PTA S/P I&D at bedside on 7/12, no pus was expressed. Neck CT described above. Pt reports improvement this AM. WBC downtrending 17 to 15.  29yo male with throat pain x1 wk, odynophagia to both liquids and solids. Exam revealed a possible left PTA S/P I&D at bedside on 7/12, no pus was expressed, today got out some debris and opening everything up, is improving.  Neck CT described above. Pt reports improvement this AM. WBC downtrending 17 to 15.

## 2021-07-13 NOTE — ED CDU PROVIDER SUBSEQUENT DAY NOTE - HISTORY
CDU PROGRESS NOTE PA CHRIS: Pt resting in stretcher, posterior pharynx erythematous, b/l tonsillar enlargement L>>R. R sided tonsillar deviation. +mild muffled voice, mild trismus, No stridor or signs of air compromise. Pt states feeling better and pain has subsided. Pt declines analgesia now, will continue to monitor.

## 2021-07-13 NOTE — ED ADULT NURSE REASSESSMENT NOTE - NS ED NURSE REASSESS COMMENT FT1
13.30 Pt is evaluated by CDU MD Dario Levy . pt is feeling better.  Pt is discharged . Ml out  TRACY Gonzalez   explained the follow up care & gave the discharge summary  . Pt has stable vitals steady gait A&OX 4 at the time of Discharge
Pt report received from Jennyfer HARTMAN RN. Pt transferred to [facility] for IV antibiotics and steroids due to BL peritonsilar abscesses. Pt a/ox3 denies respiratory distress, sob, dyspnea, C/P, palpitations, n/v/d at this time. Pt states symptoms have improved somewhat.  VSS, afebrile, IV clean/dry/intact. Pt aware of plan of care, call bell within reach ,safety maintained. Will monitor and assess.
07.00 Am Received the Pt from  NADIYA Parikh . Pt is Observed for throat pain/ Peritonsillar abscess  . Received the Pt A&OX 4 obeys commands Zaina N/V/D fever chills cp SOB   Comfort care & safety measures continued  IV site looks clean & dry no signs of infiltration noted pt denies  pain IV site .  Pt is advised to call for help  call bell with in the reach pt verbalized the understanding . Pt states  he has  moderate pain with swallowing able to eat soft food  no drooling no respiratory distress speaks in clear sentences  ENT eval on progress due meds given  pending CDU  MD aguila . GCS 15/15 A&OX 4 PERRLA  size 3 Strong upper & lower extremities steady gait   No facial droop  No Hand Leg drop denies numbness tingling Continue to monitor
Pt received from NADIYA Enamorado & Elsa. Pt oriented to CDU & plan of care was discussed. Pt endorses 5/10 throat pain. Pt denies any difficulty breathing but states he has difficulty swallowing and has been spitting out saliva. Pt states he also has not been able to tolerate PO bc of discomfort with swallowing. Pt aware to notify RN or PA for any difficulty breathing or swallowing. Pt placed on continuous pulse ox sating >96% on room air. Safety & comfort measures maintained. Call bell in reach. Will continue to monitor.

## 2021-07-13 NOTE — ED CDU PROVIDER SUBSEQUENT DAY NOTE - MEDICAL DECISION MAKING DETAILS
30 yr M no sig pmh w 2-3 days of pharyngeal pain, trismus and inability to take PO. s/p drainge of PTA diagnosed on CT on left side by ENT. much improved, eating breakfast this AM while being seen. significant other reports his voice is back to normal. s/p decadron as well as IV antiobiotics. discussed with pt regarding output follow up. pt is unaware of his bradycardia hx, but denies cp, sob, dizziness, faiting episodes of heart disease. no fam hx of sudden cardiac death.   exam reassuring including no resp distress, no hoarseness, supple neck, full rOM of mandible, and left PTA w minimal swelling and no bleeding.  will obtain EKG, to rule out dysrrythmia. if no acute finding, may go home on oral antibiotic and steroids.

## 2021-07-14 PROBLEM — Z00.00 ENCOUNTER FOR PREVENTIVE HEALTH EXAMINATION: Status: ACTIVE | Noted: 2021-07-14

## 2021-07-15 ENCOUNTER — APPOINTMENT (OUTPATIENT)
Dept: OTOLARYNGOLOGY | Facility: CLINIC | Age: 31
End: 2021-07-15
Payer: COMMERCIAL

## 2021-07-15 VITALS
WEIGHT: 225 LBS | BODY MASS INDEX: 28.88 KG/M2 | HEART RATE: 58 BPM | SYSTOLIC BLOOD PRESSURE: 123 MMHG | DIASTOLIC BLOOD PRESSURE: 73 MMHG | TEMPERATURE: 98 F | HEIGHT: 74 IN

## 2021-07-15 DIAGNOSIS — J36 PERITONSILLAR ABSCESS: ICD-10-CM

## 2021-07-15 DIAGNOSIS — R07.0 PAIN IN THROAT: ICD-10-CM

## 2021-07-15 PROBLEM — Z78.9 OTHER SPECIFIED HEALTH STATUS: Chronic | Status: ACTIVE | Noted: 2021-07-12

## 2021-07-15 PROCEDURE — 99214 OFFICE O/P EST MOD 30 MIN: CPT | Mod: 25

## 2021-07-15 PROCEDURE — 31575 DIAGNOSTIC LARYNGOSCOPY: CPT | Mod: 58

## 2021-07-16 PROBLEM — R07.0 THROAT PAIN: Status: ACTIVE | Noted: 2021-07-16

## 2021-07-16 NOTE — PHYSICAL EXAM
[Midline] : trachea located in midline position [Normal] : no rashes [de-identified] : +mild left sided fullness healing well

## 2021-07-16 NOTE — PROCEDURE
[de-identified] : Procedure performed: laryngeal Endoscopy- Diagnostic\par Pre-op/post op indication: dysphonia\par Verbal and/or written consent obtained from patient, Patient was unable to cooperate with mirror\par Scope #: 3, flexible fiber optic telescope used \par Scope was introduced through the nose passed on the floor of the nose to the nasopharynx and then followed down the soft palate to the lower pharynx. The tongue Base, Larynx, Hypopharynx were examined. Base of tongue was symmetric, vallecular was clear, epiglottis was not deformed, subglottis/ pyriform and posterior pharyngeal walls were clear. No erythema, edema, pooling of secretions, masses or lesions. Airway patent, no foreign body visualized. No glottic/supraglottic edema. True vocal cords, arytenoids, vestibular folds, ventricles, pyriform sinuses, and aryepiglottic folds appear normal bilaterally. Vocal cords mobile with good contact b/l.\par mild left sided fullness- healing well \par \par

## 2021-07-16 NOTE — HISTORY OF PRESENT ILLNESS
[de-identified] : 31 y/o M f/u after being seen in the ER for left PTA 07/12/2021. Pt s/p left PTA I&D that was done 7/12, still taking abx. \par Pt states feeling better, however with some discomfort and itchiness at the area. \par sxs resolving \par CT done \par Pt denies dysphagia, dysphonia

## 2021-07-16 NOTE — END OF VISIT
[FreeTextEntry3] : I personally saw and examined PHILIP MENA in detail. I spoke to TRACY Davis regarding the assessment and plan of care.  I preformed the procedures and I reviewed the above assessment and plan of care, and agree. I have made changes in changes in the body of the note where appropriate.\par \par

## 2021-07-16 NOTE — ASSESSMENT
[FreeTextEntry1] : 31 y/o M who presents with peritonsillar abscess, pt now s/p left PTA I&D that was done 7/12. On exam today mild fullness left side but healing \par - advised if sxs worsen to let us know, and will monitor \par - can consider taking out tonsils if recurrent

## 2021-07-17 LAB
CULTURE RESULTS: SIGNIFICANT CHANGE UP
SPECIMEN SOURCE: SIGNIFICANT CHANGE UP

## 2022-12-20 NOTE — ED PROVIDER NOTE - NSFOLLOWUPINSTRUCTIONS_ED_ALL_ED_FT
89 Hydrate.    Warm salty water gargling for throat pain.    Take Ibuprofen or Tylenol for pain as package directed and respect warnings on the label.    See the information of pharyngitis.     Follow up with your primary Dr. for reevaluation, call tomorrow for an appointment.    Return for any concerns or worsening symptoms.

## 2025-07-07 ENCOUNTER — EMERGENCY (EMERGENCY)
Facility: HOSPITAL | Age: 35
LOS: 1 days | End: 2025-07-07
Attending: EMERGENCY MEDICINE
Payer: COMMERCIAL

## 2025-07-07 VITALS
WEIGHT: 315 LBS | DIASTOLIC BLOOD PRESSURE: 72 MMHG | OXYGEN SATURATION: 97 % | HEART RATE: 61 BPM | TEMPERATURE: 98 F | HEIGHT: 75 IN | SYSTOLIC BLOOD PRESSURE: 103 MMHG | RESPIRATION RATE: 20 BRPM

## 2025-07-07 PROCEDURE — 99283 EMERGENCY DEPT VISIT LOW MDM: CPT

## 2025-07-07 PROCEDURE — 73564 X-RAY EXAM KNEE 4 OR MORE: CPT

## 2025-07-07 PROCEDURE — 73564 X-RAY EXAM KNEE 4 OR MORE: CPT | Mod: 26,LT

## 2025-07-07 PROCEDURE — 99283 EMERGENCY DEPT VISIT LOW MDM: CPT | Mod: 25

## 2025-07-07 NOTE — ED PROVIDER NOTE - PROGRESS NOTE DETAILS
X-ray shows left knee effusion, arthritic changes.  Patient given Ace wrap.  Offered to place knee immobilizer but would prefer to take at home and use as needed.  Will also give patient sports medicine clinic follow-up.  To be discharged.  Osmel Ariza PGY-4

## 2025-07-07 NOTE — ED PROVIDER NOTE - NSICDXFAMILYHX_GEN_ALL_CORE_FT
FAMILY HISTORY:  Mother  Still living? Unknown  Family history of brain aneurysm, Age at diagnosis: Age Unknown

## 2025-07-07 NOTE — ED PROVIDER NOTE - PATIENT PORTAL LINK FT
You can access the FollowMyHealth Patient Portal offered by [facility] by registering at the following website: [email or web address]. By joining [employer]’s FollowMyHealth portal, you will also be able to view your health information using other applications (apps) compatible with our system.

## 2025-07-07 NOTE — ED PROVIDER NOTE - NSICDXNOPASTMEDICALHX_GEN_ALL_ED
Female
<-- Click to add NO pertinent Past Medical History
Partial Purse String (Simple) Text: Given the location of the defect and the characteristics of the surrounding skin a simple purse string closure was deemed most appropriate.  Undermining was performed circumferentially around the surgical defect.  A purse string suture was then placed and tightened. Wound tension of the circular defect prevented complete closure of the wound.

## 2025-07-07 NOTE — ED PROVIDER NOTE - ATTENDING CONTRIBUTION TO CARE
34m no relevant med hx pw left knee pain several days after bball injury  on exam, effusion but can bear weight  xray neg for fx  knee immob and cane prn (pt doesn't want to use either at this moment)  mri as outpatient  sports clinic

## 2025-07-07 NOTE — ED PROVIDER NOTE - NSFOLLOWUPCLINICS_GEN_ALL_ED_FT
Vital Signs: I have reviewed the initial vital signs.  Constitutional: well-appearing, appears stated age, nontoxic appearing, NAD  Eyes: PERRLA, EOM intact, RAPD absent, and symmetrical lids.  ENT: Neck supple with no adenopathy, moist MM.  Cardiovascular: regular rate, regular rhythm, well-perfused extremities, pulses 2+ b/l ue and le, no LE edema. +S1S2  Respiratory: unlabored respiratory effort, clear to auscultation bilaterally  Gastrointestinal: +TTP RUQ, RLQ, soft, ND. no guarding or rebound, no cvat b/l  Musculoskeletal: FROM of b/l UE and LE without any focal TTP  Integumentary: warm, dry, no rash  Neurologic: awake, alert, cranial nerves II-XII grossly intact, extremities’ motor and sensory functions grossly intact.  Psychiatric: A&Ox3, appropriate mood, appropriate affect [facility]  Sports Medicine  [address] [place] [address]  Phone: [phone]  Fax:

## 2025-07-07 NOTE — ED PROVIDER NOTE - PHYSICAL EXAMINATION
GEN: NAD. A&Ox3. Non-toxic appearing.  HEENT: normocephalic, atraumatic, EOMI  CARDIAC: RRR, S1, S2, no murmur. DP pulses present and symmetric b/l  PULM: unlabored brathing on RA  ABD: nondistended  MSK: mild L knee swelling with full ROM, no ttp  NEURO: no focal neurological deficits, CN 2-12 grossly intact  SKIN: warm, dry, no rash.

## 2025-07-07 NOTE — ED PROVIDER NOTE - NSFOLLOWUPINSTRUCTIONS_ED_ALL_ED_FT
You were seen in the emergency department for left knee pain.  Your results are attached.    Contact information is attached for the sports medicine clinic.  Please call to make an appointment.      You can use 500-1000mg Tylenol every 6 hours for pain - as needed.  This is an over-the-counter medications - please respect the warnings on the label. This medication come with certain risks and side effects that you need to discuss with your doctor, especially if you are taking it for a prolonged period.    You can use 400-600mg Ibuprofen (such as motrin or advil) every 6 to 8 hours as needed for pain control.  Take ibuprofen with food or milk to lessen stomach upset.  This is an over-the-counter medication please respect the warnings on the label. All medications come with certain risks and side effects that you need to discuss with your doctor, especially if you are taking them for a prolonged period.    You also provided an Ace wrap and knee immobilizer.  Please use as needed when ambulating.    You may department or worsening symptoms.

## 2025-07-07 NOTE — ED ADULT NURSE NOTE - OBJECTIVE STATEMENT
33 yo presents to the ED from home. A&Ox4, ambulatory c/o 2 to 3 days of left knee discomfort and popping sensation after playing basketball. States he will able to ambulate for more concern for the popping as he is a  on his feet most of the day. Took Aleve 2 days ago. Denies fevers, numbness, weakness.  Left knee with mild swelling, full range of motion, history of L patella dislocation. plan of care discussed. safety and comfort measures maintained.

## 2025-07-07 NOTE — ED PROVIDER NOTE - CLINICAL SUMMARY MEDICAL DECISION MAKING FREE TEXT BOX
34-year-old male past history of left patella dislocation presents with 2 to 3 days of left knee discomfort and popping sensation after playing basketball.  States he will able to ambulate for more concern for the popping as he is a  on his feet most of the day.  Took Aleve 2 days ago.  Denies fevers, numbness, weakness.  Left knee with mild swelling, full range of motion, no tenderness to palpation, neurovascular intact.  Will obtain x-ray to eval for fracture/dislocation, no concern for infectious etiology at this time.  X-ray, discharge with sports medicine follow-up.  Declines pain meds at this time.

## 2025-07-08 ENCOUNTER — APPOINTMENT (OUTPATIENT)
Dept: SPORTS MEDICINE | Facility: CLINIC | Age: 35
End: 2025-07-08

## 2025-07-08 VITALS — WEIGHT: 210 LBS | BODY MASS INDEX: 26.11 KG/M2 | HEIGHT: 75 IN

## 2025-07-08 PROBLEM — M23.92 INTERNAL DERANGEMENT OF LEFT KNEE: Status: ACTIVE | Noted: 2025-07-08

## 2025-07-08 PROBLEM — S83.8X2D INJURY OF MENISCUS OF LEFT KNEE, SUBSEQUENT ENCOUNTER: Status: ACTIVE | Noted: 2025-07-08

## 2025-07-08 PROBLEM — M25.562 ACUTE PAIN OF LEFT KNEE: Status: ACTIVE | Noted: 2025-07-08

## 2025-07-08 PROCEDURE — 99214 OFFICE O/P EST MOD 30 MIN: CPT

## 2025-07-08 RX ORDER — MELOXICAM 15 MG/1
15 TABLET ORAL DAILY
Qty: 30 | Refills: 0 | Status: ACTIVE | COMMUNITY
Start: 2025-07-08 | End: 1900-01-01

## 2025-07-16 ENCOUNTER — APPOINTMENT (OUTPATIENT)
Dept: MRI IMAGING | Facility: CLINIC | Age: 35
End: 2025-07-16

## 2025-07-22 ENCOUNTER — APPOINTMENT (OUTPATIENT)
Dept: SPORTS MEDICINE | Facility: CLINIC | Age: 35
End: 2025-07-22

## 2025-08-04 ENCOUNTER — RX RENEWAL (OUTPATIENT)
Age: 35
End: 2025-08-04